# Patient Record
Sex: FEMALE | Race: WHITE | NOT HISPANIC OR LATINO | URBAN - METROPOLITAN AREA
[De-identification: names, ages, dates, MRNs, and addresses within clinical notes are randomized per-mention and may not be internally consistent; named-entity substitution may affect disease eponyms.]

---

## 2018-12-10 ENCOUNTER — SEE NOTE (OUTPATIENT)
Dept: URBAN - METROPOLITAN AREA CLINIC 36 | Facility: CLINIC | Age: 28
Setting detail: DERMATOLOGY
End: 2018-12-10

## 2018-12-10 PROCEDURE — 11310 SHAVE SKIN LESION 0.5 CM/<: CPT

## 2019-02-13 ENCOUNTER — SKIN CANCER EXAM (OUTPATIENT)
Dept: URBAN - METROPOLITAN AREA CLINIC 36 | Facility: CLINIC | Age: 29
Setting detail: DERMATOLOGY
End: 2019-02-13

## 2019-02-13 ENCOUNTER — RX ONLY (RX ONLY)
Age: 29
End: 2019-02-13

## 2019-02-13 DIAGNOSIS — L90.5 SCAR CONDITIONS AND FIBROSIS OF SKIN: ICD-10-CM

## 2019-02-13 PROCEDURE — 99214 OFFICE O/P EST MOD 30 MIN: CPT

## 2019-02-13 RX ORDER — CRISABOROLE 20 MG/G
1 APPLICATION OINTMENT TOPICAL BID
Qty: 60 | Refills: 3 | Status: DISCONTINUED
Start: 2019-02-13 | End: 2020-01-13

## 2020-01-13 ENCOUNTER — WORRISOME GROWTH - SEE NOTE (OUTPATIENT)
Dept: URBAN - METROPOLITAN AREA CLINIC 36 | Facility: CLINIC | Age: 30
Setting detail: DERMATOLOGY
End: 2020-01-13

## 2020-01-13 ENCOUNTER — RX ONLY (RX ONLY)
Age: 30
End: 2020-01-13

## 2020-01-13 DIAGNOSIS — B07.8 OTHER VIRAL WARTS: ICD-10-CM

## 2020-01-13 DIAGNOSIS — T07 UNSPECIFIED MULTIPLE INJURIES: ICD-10-CM

## 2020-01-13 PROBLEM — L71.8 OTHER ROSACEA: Status: RESOLVED | Noted: 2020-01-13

## 2020-01-13 PROBLEM — L71.8 OTHER ROSACEA: Status: ACTIVE | Noted: 2020-01-13

## 2020-01-13 PROCEDURE — 99213 OFFICE O/P EST LOW 20 MIN: CPT

## 2020-01-13 PROCEDURE — 10060 I&D ABSCESS SIMPLE/SINGLE: CPT

## 2020-01-17 ENCOUNTER — RX ONLY (RX ONLY)
Age: 30
End: 2020-01-17

## 2020-01-17 RX ORDER — SPIRONOLACTONE 50 MG/1
1 TABLET TABLET, FILM COATED ORAL BID
Qty: 180 | Refills: 0
Start: 2020-01-17

## 2020-08-27 ENCOUNTER — WORRISOME GROWTH - SEE NOTE (OUTPATIENT)
Dept: URBAN - METROPOLITAN AREA CLINIC 32 | Facility: CLINIC | Age: 30
Setting detail: DERMATOLOGY
End: 2020-08-27

## 2020-08-27 DIAGNOSIS — L63.9 ALOPECIA AREATA, UNSPECIFIED: ICD-10-CM

## 2020-08-27 DIAGNOSIS — L21.8 OTHER SEBORRHEIC DERMATITIS: ICD-10-CM

## 2020-08-27 PROBLEM — D69.2 OTHER NONTHROMBOCYTOPENIC PURPURA: Status: ACTIVE | Noted: 2020-08-27

## 2020-08-27 PROBLEM — D69.2 OTHER NONTHROMBOCYTOPENIC PURPURA: Status: RESOLVED | Noted: 2020-08-27

## 2020-08-27 PROCEDURE — 99213 OFFICE O/P EST LOW 20 MIN: CPT

## 2021-06-22 ENCOUNTER — FOLLOW UP (OUTPATIENT)
Dept: URBAN - METROPOLITAN AREA CLINIC 36 | Facility: CLINIC | Age: 31
Setting detail: DERMATOLOGY
End: 2021-06-22

## 2021-06-22 ENCOUNTER — RX ONLY (RX ONLY)
Age: 31
End: 2021-06-22

## 2021-06-22 DIAGNOSIS — R68.89 OTHER GENERAL SYMPTOMS AND SIGNS: ICD-10-CM

## 2021-06-22 PROCEDURE — 11900 INJECT SKIN LESIONS </W 7: CPT

## 2021-09-08 ENCOUNTER — WEB ENCOUNTER (OUTPATIENT)
Dept: URBAN - METROPOLITAN AREA CLINIC 92 | Facility: CLINIC | Age: 31
End: 2021-09-08

## 2021-09-08 ENCOUNTER — OFFICE VISIT (OUTPATIENT)
Dept: URBAN - METROPOLITAN AREA CLINIC 92 | Facility: CLINIC | Age: 31
End: 2021-09-08
Payer: COMMERCIAL

## 2021-09-08 DIAGNOSIS — R14.0 BLOATING: ICD-10-CM

## 2021-09-08 DIAGNOSIS — K30 FUNCTIONAL DYSPEPSIA: ICD-10-CM

## 2021-09-08 DIAGNOSIS — K58.0 IRRITABLE BOWEL SYNDROME WITH DIARRHEA: ICD-10-CM

## 2021-09-08 PROCEDURE — 99204 OFFICE O/P NEW MOD 45 MIN: CPT | Performed by: INTERNAL MEDICINE

## 2021-09-08 RX ORDER — OMEPRAZOLE 40 MG/1
TAKE 1 CAPSULE (40 MG) BY ORAL ROUTE ONCE DAILY BEFORE A MEAL IN THE AM CAPSULE, DELAYED RELEASE PELLETS ORAL 1
Qty: 90 | Refills: 3 | Status: ACTIVE | COMMUNITY
Start: 2018-12-03

## 2021-09-08 RX ORDER — LACTOBACIL 2/BIFIDO 1/S.THERMO 900B CELL
AS DIRECTED PACKET (EA) ORAL TWICE A DAY
Qty: 60 | Refills: 3 | OUTPATIENT
Start: 2021-09-08 | End: 2022-01-05

## 2021-09-08 RX ORDER — ATOMOXETINE HYDROCHLORIDE 40 MG/1
TAKE 1 CAPSULE (40 MG) BY ORAL ROUTE ONCE DAILY IN THE MORNING CAPSULE ORAL 1
Qty: 0 | Refills: 0 | Status: ACTIVE | COMMUNITY
Start: 1900-01-01

## 2021-09-08 NOTE — HPI-TODAY'S VISIT:
31yoF presents for eval of bloating. She has seen Dr. Poole in the past for burping, bloating and GERD, all of which she complains of today as well.  Neg HIDA, GES, SBS in the past  Now notes persistent post-prandial bloating with all po intake, worse with carbonation as well as persistent eructation which is loud and frequent. She does have a hx of GERD, normal EGD in 2013, bx neg HP and celiac. On daily PPI and no heartburn or regurg. No dysphagia. Eructation worse with increased carbonation.  She also notes lower abd cramping with loose stools every 1-2 days. No hematochezia or melena. Cramping better with prn bentyl.  Colon 9/2014 granularity of TI and poor prep of colon  Feels like anxiety increases her sx

## 2021-11-03 ENCOUNTER — OFFICE VISIT (OUTPATIENT)
Dept: URBAN - METROPOLITAN AREA CLINIC 92 | Facility: CLINIC | Age: 31
End: 2021-11-03

## 2021-11-03 RX ORDER — LACTOBACIL 2/BIFIDO 1/S.THERMO 900B CELL
AS DIRECTED PACKET (EA) ORAL TWICE A DAY
Qty: 60 | Refills: 3 | OUTPATIENT

## 2021-11-03 NOTE — HPI-TODAY'S VISIT:
31yoF seen in Sept for eval of bloating. She has seen Dr. Poole in the past for burping, bloating and GERD, all of which she complained of at last OV.  Neg HIDA, GES, SBS in the past    Noted persistent post-prandial bloating with all po intake, worse with carbonation as well as persistent eructation which is loud and frequent. She does have a hx of GERD, normal EGD in 2013, bx neg HP and celiac. On daily PPI and no heartburn or regurg. No dysphagia. Eructation worse with increased carbonation.  She also noted lower abd cramping with loose stools every 1-2 days. No hematochezia or melena. Cramping better with prn bentyl.  Colon 9/2014 granularity of TI and poor prep of colon  Feels like anxiety increases her sx Given xifaxin followed by visbiome and asked to cut carbonation,straws, gum. Now notes P

## 2022-02-03 ENCOUNTER — OFFICE VISIT (OUTPATIENT)
Dept: URBAN - METROPOLITAN AREA CLINIC 92 | Facility: CLINIC | Age: 32
End: 2022-02-03
Payer: COMMERCIAL

## 2022-02-03 VITALS
HEIGHT: 61 IN | BODY MASS INDEX: 31.72 KG/M2 | TEMPERATURE: 98.3 F | HEART RATE: 76 BPM | SYSTOLIC BLOOD PRESSURE: 112 MMHG | WEIGHT: 168 LBS | DIASTOLIC BLOOD PRESSURE: 85 MMHG

## 2022-02-03 DIAGNOSIS — K30 FUNCTIONAL DYSPEPSIA: ICD-10-CM

## 2022-02-03 DIAGNOSIS — K21.9 GERD WITHOUT ESOPHAGITIS: ICD-10-CM

## 2022-02-03 DIAGNOSIS — R13.10 PILL DYSPHAGIA: ICD-10-CM

## 2022-02-03 DIAGNOSIS — R14.0 BLOATING: ICD-10-CM

## 2022-02-03 DIAGNOSIS — K58.0 IRRITABLE BOWEL SYNDROME WITH DIARRHEA: ICD-10-CM

## 2022-02-03 PROCEDURE — 99213 OFFICE O/P EST LOW 20 MIN: CPT | Performed by: PHYSICIAN ASSISTANT

## 2022-02-03 RX ORDER — OMEPRAZOLE 40 MG/1
TAKE 1 CAPSULE (40 MG) BY ORAL ROUTE ONCE DAILY BEFORE A MEAL IN THE AM CAPSULE, DELAYED RELEASE PELLETS ORAL 1
Qty: 90 | Refills: 3 | Status: ACTIVE | COMMUNITY
Start: 2018-12-03

## 2022-02-03 RX ORDER — ATOMOXETINE HYDROCHLORIDE 40 MG/1
TAKE 1 CAPSULE (40 MG) BY ORAL ROUTE ONCE DAILY IN THE MORNING CAPSULE ORAL 1
Qty: 0 | Refills: 0 | Status: ACTIVE | COMMUNITY
Start: 1900-01-01

## 2022-02-03 RX ORDER — LACTOBACIL 2/BIFIDO 1/S.THERMO 900B CELL
AS DIRECTED PACKET (EA) ORAL TWICE A DAY
Qty: 60 | Refills: 3 | Status: ON HOLD | COMMUNITY

## 2022-02-03 NOTE — HPI-TODAY'S VISIT:
31yoF seen in Sept for eval of bloating. She has seen Dr. Poole in the past for burping, bloating and GERD, all of which she complained of at last OV as well.  Neg HIDA, GES, SBS in the past     Noted persistent post-prandial bloating with all po intake, worse with carbonation as well as persistent eructation which is loud and frequent. She does have a hx of GERD, normal EGD in 2013, bx neg HP and celiac. On daily PPI and no heartburn or regurg. Has rare indigestion with acidic juices. No dysphagia to solids or liquids but recently notes dysphagia to pills. She cutt out carbonation, soda, straws the eructation and dyspepsia have resolved.   She also noted lower abd cramping with loose stools every 1-2 days. No hematochezia or melena. Cramping better with prn bentyl.  Colon 9/2014 granularity of TI and poor prep of colon  Given xifaxin and stools are now formed and not loose and cramping resolved but still has some bloating, improved with meds and less dairy/gluten.

## 2022-03-21 ENCOUNTER — WEB ENCOUNTER (OUTPATIENT)
Dept: URBAN - METROPOLITAN AREA CLINIC 92 | Facility: CLINIC | Age: 32
End: 2022-03-21

## 2022-04-06 ENCOUNTER — OFFICE VISIT (OUTPATIENT)
Dept: URBAN - METROPOLITAN AREA SURGERY CENTER 16 | Facility: SURGERY CENTER | Age: 32
End: 2022-04-06

## 2023-02-09 ENCOUNTER — DASHBOARD ENCOUNTERS (OUTPATIENT)
Age: 33
End: 2023-02-09

## 2023-02-09 PROBLEM — 197125005: Status: ACTIVE | Noted: 2021-09-08

## 2023-02-09 PROBLEM — 399122003: Status: ACTIVE | Noted: 2022-02-03

## 2023-02-09 PROBLEM — 3696007: Status: ACTIVE | Noted: 2021-09-08

## 2023-02-09 PROBLEM — 266435005: Status: ACTIVE | Noted: 2022-02-03

## 2023-02-10 ENCOUNTER — OFFICE VISIT (OUTPATIENT)
Dept: URBAN - METROPOLITAN AREA TELEHEALTH 2 | Facility: TELEHEALTH | Age: 33
End: 2023-02-10

## 2023-02-10 VITALS — HEIGHT: 61 IN | WEIGHT: 168 LBS | BODY MASS INDEX: 31.72 KG/M2

## 2023-02-10 RX ORDER — LACTOBACIL 2/BIFIDO 1/S.THERMO 900B CELL
AS DIRECTED PACKET (EA) ORAL TWICE A DAY
Qty: 60 | Refills: 3 | Status: ON HOLD | COMMUNITY

## 2023-02-10 RX ORDER — ATOMOXETINE HYDROCHLORIDE 40 MG/1
TAKE 1 CAPSULE (40 MG) BY ORAL ROUTE ONCE DAILY IN THE MORNING CAPSULE ORAL 1
Qty: 0 | Refills: 0 | Status: ACTIVE | COMMUNITY
Start: 1900-01-01

## 2023-02-10 RX ORDER — OMEPRAZOLE 40 MG/1
TAKE 1 CAPSULE (40 MG) BY ORAL ROUTE ONCE DAILY BEFORE A MEAL IN THE AM CAPSULE, DELAYED RELEASE PELLETS ORAL 1
Qty: 90 | Refills: 3 | Status: ACTIVE | COMMUNITY
Start: 2018-12-03

## 2023-02-10 RX ORDER — LACTOBACIL 2/BIFIDO 1/S.THERMO 900B CELL
AS DIRECTED PACKET (EA) ORAL TWICE A DAY
Qty: 60 | Refills: 3 | COMMUNITY

## 2023-02-10 RX ORDER — ATOMOXETINE HYDROCHLORIDE 40 MG/1
TAKE 1 CAPSULE (40 MG) BY ORAL ROUTE ONCE DAILY IN THE MORNING CAPSULE ORAL 1
Qty: 0 | Refills: 0 | COMMUNITY
Start: 1900-01-01

## 2023-02-10 RX ORDER — OMEPRAZOLE 40 MG/1
TAKE 1 CAPSULE (40 MG) BY ORAL ROUTE ONCE DAILY BEFORE A MEAL IN THE AM CAPSULE, DELAYED RELEASE PELLETS ORAL 1
Qty: 90 | Refills: 3 | COMMUNITY
Start: 2018-12-03

## 2023-02-10 NOTE — HPI-TODAY'S VISIT:
32yoF presents for annual OV.  She was seen in Sept 2021 for eval of bloating. She has seen Dr. Poole in the past for burping, bloating and GERD, all of which she complained of at last OV as well.  Neg HIDA, GES, SBS in the past      Noted persistent post-prandial bloating with all po intake, worse with carbonation as well as persistent eructation which is loud and frequent. She does have a hx of GERD, normal EGD in 2013, bx neg HP and celiac. On daily PPI and no heartburn or regurg. Has rare indigestion with acidic juices. No dysphagia to solids or liquids but last year noted dysphagia to pills. She cutt out carbonation, soda, straws the eructation and dyspepsia have resolved. Discussed EGD but not done  She also noted lower abd cramping with loose stools every 1-2 days. No hematochezia or melena. Cramping better with prn bentyl.  Colon 9/2014 granularity of TI and poor prep of colon  Given xifaxin in 2022 with sign improvement and advised to add IBgard and stay off dairy/gluten  P

## 2023-02-10 NOTE — HPI-TODAY'S VISIT:
32yoF seen in Sept 2021 for eval of bloating. She has seen Dr. Poole in the past for burping, bloating and GERD, all of which she complained of at last OV as well.  Neg HIDA, GES, SBS in the past     Noted persistent post-prandial bloating with all po intake, worse with carbonation as well as persistent eructation which is loud and frequent. She does have a hx of GERD, normal EGD in 2013, bx neg HP and celiac. On daily PPI and no heartburn or regurg. Has rare indigestion with acidic juices. No dysphagia to solids or liquids but recently notes dysphagia to pills. She cut out carbonation, soda, straws the eructation and dyspepsia have resolved.   She also noted lower abd cramping with loose stools every 1-2 days. No hematochezia or melena. Cramping better with prn bentyl.  Colon 9/2014 granularity of TI and poor prep of colon  She was given xifaxin in 2022 with resolution of loose bowels, cramping and bloating. ?ibgard P  egd not done>